# Patient Record
Sex: MALE | Race: WHITE | NOT HISPANIC OR LATINO | Employment: FULL TIME | URBAN - METROPOLITAN AREA
[De-identification: names, ages, dates, MRNs, and addresses within clinical notes are randomized per-mention and may not be internally consistent; named-entity substitution may affect disease eponyms.]

---

## 2021-10-28 ENCOUNTER — OFFICE VISIT - RIVER FALLS (OUTPATIENT)
Dept: FAMILY MEDICINE | Facility: CLINIC | Age: 61
End: 2021-10-28

## 2021-10-28 ASSESSMENT — MIFFLIN-ST. JEOR: SCORE: 1846.93

## 2022-02-12 VITALS — HEIGHT: 69 IN | BODY MASS INDEX: 34.47 KG/M2 | WEIGHT: 232.7 LBS

## 2022-02-15 NOTE — NURSING NOTE
Quick Intake Entered On:  10/30/2021 4:31 PM CDT    Performed On:  10/28/2021 4:31 PM CDT by Lydia Leon               Summary   Weight Measured :   232.7 lb(Converted to: 232 lb 11 oz, 105.551 kg)    Height Measured :   68.75 in(Converted to: 5 ft 9 in, 174.62 cm)    Body Mass Index :   34.61 kg/m2 (HI)    Body Surface Area :   2.26 m2   Lydia Leon - 10/30/2021 4:31 PM CDT

## 2022-02-15 NOTE — PROGRESS NOTES
Patient:   ARIS RASMUSSEN            MRN: [id number]            FIN: [id number]               Age:   61 years     Sex:  Male     :  1960   Associated Diagnoses:   Diabetes mellitus, type II; Obesity   Author:   Lydia Leon      Visit Information   Visit type:  Diabetes Self Management Education .    Referral source:  Zac GUILLAUME, Dr. Renan Gee  [facility] .       Chief Complaint   Diabetes Self Management Education, Obesity      History of Present Illness   10/28/2021  Pt here with hx of DM a1c above target.    Meds: Trulicity 0.75mg weekly, Farxiga 10mg,pt stopped Metformin stopped due to diarrhea    Discussed nutrition, diabetes, and lifestyle management with pt  Nutrition: enjoys pasta but     Fluids: No SSB, water, unsweetened tea, coffee  Physical Activity:  tries to stay active no exercise routine  Stress:   low  Sleep: good  Support: family  BG Testin day avg out of 14 readings 186 mg/dL  90 day avg out of 38 readings 187 mg/dL    DM related medication: Trulicity 0.75mg weekly, Farxiga 10mg,pt stopped Metformin stopped due to diarrhea  Routine diabetes care:    Dilated Retinal Eye Exam:  UTD  Skin: intact  Dental: due   Feet: monofilament test completed with MD   Immunizations: influenza and pneumonia vaccines completed   Hgb A1c: 8.2% = 189 mg/dL estimated average glucose 2021       Health Status   Allergies:    No active allergies have been recorded.   Medications:    Medications          No medications documented     Problem list:    All Problems  Obesity / SNOMED CT [id number] / Probable      Histories   Past Medical History:    No active or resolved past medical history items have been selected or recorded.   Family History:    No family history items have been selected or recorded.   Procedure history:    No active procedure history items have been selected or recorded.   Social History:             No active social history items have been recorded.      Health  Maintenance      Recommendations     Pending (in the next year)        OverDue           Influenza Vaccine due  09/01/21  and every 1  year(s)        Due            Alcohol Misuse Screen due  10/30/21  and every 1  year(s)           Colorectal Cancer Screen (Colonoscopy) due  10/30/21  Variable frequency           Colorectal Cancer Screen (Occult Blood) due  10/30/21  Variable frequency           Colorectal Cancer Screen (Sigmoidoscopy) due  10/30/21  Variable frequency           Depression Screen due  10/30/21  and every 1  year(s)           HIV Screen (if sexually active) due  10/30/21  and every 1  year(s)           Hepatitis C Screen 5983-6043 due  10/30/21  One-time only           High Blood Pressure Screen due  10/30/21  and every 1  year(s)           Lipid Disorders Screen due  10/30/21  and every 1  year(s)           Lung Cancer Screen due  10/30/21  and every 1  year(s)           STD Counseling (if sexually active) due  10/30/21  and every 1  year(s)           Syphilis Screen (if sexually active) due  10/30/21  and every 1  year(s)           Tobacco Use Screen due  10/30/21  and every 1  year(s)        Due In Future            Body Mass Index Check not due until  10/28/22  and every 1  year(s)           Obesity Screen and Counseling not due until  10/28/22  and every 1  year(s)     Satisfied (in the past 1 year)        Satisfied            Body Mass Index Check on  10/28/21.           Obesity Screen and Counseling on  10/28/21.          Impression and Plan   Diagnosis     Diabetes mellitus, type II (QLM32-UB E11.9).     Obesity (UYO13-PE E66.9).       Counseled: PatientDACIA Self Care Behaviors   Today the patient was instructed on the basic physiology of diabetes mellitus, BG testing, and lifestyle guidelines.  Healthy Eating - Education on what foods are primary sources of carbohydrates and how intake affects BG readings, edu on carbohydrate counting, reading food labels, appropriate portion sizes, well  balanced meals, diabetic plate method as a general rule.  Patient is provided with numerous ideas to incorporate dietary recommendations, meal planning and preparation, mindful eating techniques and weight loss tips.    Being Active - Education on how exercise helps with :    - lowering BG by increasing the muscles ability to take up and use glucose   - weight loss   - healthier heart (improve lipid profile)   - improve sleep, mood, energy   - decrease stress      Monitoring - Reviewed recommended testing schedule and blood glucose target levels.    Taking Medication - Education on the 8 core defects of type II diabetes and how the different classes of medications have different primary physiological actions.  Discussed trying to restart metformin at lower dose and monitor tolerance.  Also recommend increase in trulicity dosing to help promote glycemic control   Trulicity   1.5mg weekly x 4 weeks   3.0mg weekly x 4 weeks   4.5mg weekly goal dose    Continue with Farxiga at 10mg     Problem Solving -  medical support team assistance, resources provided (written, internet, apps); good control of stress  Healthy Coping - support of friends, family, and medical support team   Reducing Risks - Education on importance of good glucose control to help reduce the potential for developing microvascular and macrovascular complications associated with high blood glucose over time.    Education on the following complications    *heart attack/cardiovascular disease - prevention with heart healthy diet, daily activity, and weight control   *retinopathy - annual eye exam   *nephropathy - discussion on annual or prn kidney function labs with urinalysis    *stroke - maintaining recommended glucose control   *peripheral arterial disease - regular activity, maintaining recommended glucose control   *neuropathy - monofilament testing, regular activity, maintaining recommended glucose control  Appropriate foot care  education  Vaccinations as recommended influenza, pneumonia etc.   Routine dental appt.'s for prevention of periodontal diseases   Importance of adequate sleep   Good skin care, monitor for any open areas, sores, or cuts.      Pt able to verbalize understanding of above education, handouts provided for additional resources.       Goals:  1.  A1c <7%  2.  BG testing 2x/ day on 2 days/ week before eating 80 - 130 target; two hours after eating 80 - 150mg/dL  3.  Physical active 150 min/ week   4.  Carb controlled heart healthy meal plan <130gm CHO/ day; <200mg Cholesterol/ day   5.  Take medications as directed continue Farxiga 10mg qd, restart metformin at lower dose and monitor tolerance.  Recommend increase in Trulicity   1.5mg weekly x 4 weeks   3.0mg weekly x 4 weeks   4.5mg weekly goal dose      Follow up in 6 mo      Professional Services   Time spent with pt 60 min   cc Dr. Frank   cc Dr. Renan [facility]

## 2022-04-06 ENCOUNTER — ALLIED HEALTH/NURSE VISIT (OUTPATIENT)
Dept: EDUCATION SERVICES | Facility: CLINIC | Age: 62
End: 2022-04-06
Payer: COMMERCIAL

## 2022-04-06 VITALS — HEIGHT: 69 IN | WEIGHT: 224.2 LBS | BODY MASS INDEX: 33.21 KG/M2

## 2022-04-06 DIAGNOSIS — E11.9 TYPE 2 DIABETES, HBA1C GOAL < 7% (H): Primary | ICD-10-CM

## 2022-04-06 PROCEDURE — G0108 DIAB MANAGE TRN  PER INDIV: HCPCS | Performed by: DIETITIAN, REGISTERED

## 2022-04-06 RX ORDER — SIMVASTATIN 10 MG
10 TABLET ORAL AT BEDTIME
COMMUNITY
Start: 2022-02-01

## 2022-04-06 RX ORDER — DAPAGLIFLOZIN 10 MG/1
TABLET, FILM COATED ORAL
COMMUNITY
Start: 2022-02-10

## 2022-04-06 RX ORDER — DULAGLUTIDE 3 MG/.5ML
INJECTION, SOLUTION SUBCUTANEOUS
COMMUNITY
Start: 2022-03-02

## 2022-04-06 NOTE — LETTER
"    4/6/2022         RE: Lew Hurt  [address]  [place] [address]        Dear Colleague,    Thank you for referring your patient, Lew Hurt, to the [facility]. Please see a copy of my visit note below.    Diabetes Self-Management Education & Support    Presents for:  Follow up education    SUBJECTIVE/OBJECTIVE:  Diabetes education in the past 24mo: No  Diabetes type: Type 2  Disease course: Stable  How confident are you filling out medical forms by yourself:: Extremely  Cultural Influences/Ethnic Background:  Not  or     Pt is not able to fill Trulicity (too early) but will be changing to higher dose to help improve a1c and satiety.    Pt has been able to tolerate 1 tab metformin 500mg without diarrhea.    Continue with Farxiga 10mg   Pt taking all meds as directed    Pt's wife has started a weight loss plan and they have been working on improving dietary intake.      Diabetes Symptoms & Complications:  Fatigue: Yes  Neuropathy: Sometimes  Polydipsia: Sometimes  Polyphagia: No  Polyuria: No  Visual change: Sometimes  Slow healing wounds: No  Autonomic neuropathy: No  CVA: No  Heart disease: No  Nephropathy: No  Peripheral neuropathy: No  Peripheral Vascular Disease: No  Retinopathy: No eye exam completed at [facility] - follow up in 1 year   Sexual dysfunction: Yes    Patient Problem List and Family Medical History reviewed for relevant medical history, current medical status, and diabetes risk factors.    Vitals:  Ht 1.746 m (5' 8.75\")   Wt 101.7 kg (224 lb 3.2 oz)   BMI 33.35 kg/m    Estimated body mass index is 33.35 kg/m  as calculated from the following:    Height as of this encounter: 1.746 m (5' 8.75\").    Weight as of this encounter: 101.7 kg (224 lb 3.2 oz).   Last 3 BP:   BP Readings from Last 3 Encounters:   No data found for BP       History   Smoking Status     Not on file   Smokeless Tobacco     Not on file       Labs:  No results found " for: A1C  No results found for: GLC  No results found for: LDL  No results found for: HDL]  No results found for: GFRESTIMATED  No results found for: GFRESTBLACK  No results found for: CR  No results found for: MICROALBUMIN    Healthy Eating:  Cultural/Christianity diet restrictions?: No  Meal planning/habits: None  How many times a week on average do you eat food made away from home (restaurant/take-out)?: 2  Meals include: Breakfast, Lunch, Dinner  Beverages: Tea, Coffee    Cereal or toast - Pt knows it's all carbs   Today 2 pop tarts - 233 mg/dL     Being Active:  Barrier to exercise: Time    Monitoring:  Blood Glucose Meter: One Touch  Times checking blood sugar at home (number): 1  Times checking blood sugar at home (per): Day  Blood glucose trend: Fluctuating    BG avg 153 out of 30 day avg     Taking Medications:  Diabetes Medication(s)     Biguanides       metFORMIN (GLUCOPHAGE) 500 MG tablet    Take 500 mg by mouth    Sodium-Glucose Co-Transporter 2 (SGLT2) Inhibitors       FARXIGA 10 MG TABS tablet        Incretin Mimetic Agents (GLP-1 Receptor Agonists)       TRULICITY 3 MG/0.5ML SOPN              Current Treatments: Diet, Non-insulin Injectables, Oral Medication (taken by mouth)    Problem Solving:                 Reducing Risks:  CAD Risks: Diabetes Mellitus, Family history, Male sex, Sedentary lifestyle  Has dilated eye exam at least once a year?: Yes  Sees dentist every 6 months?: No  Feet checked by healthcare provider in the last year?: No    Healthy Coping:  Informal Support system:: Family, Spouse  Patient Activation Measure Survey Score:  No flowsheet data found.    Diabetes knowledge and skills assessment:   Patient is knowledgeable in diabetes management concepts related to: Monitoring, Taking Medication, Problem Solving, Reducing Risks and Healthy Coping    Patient needs further education on the following diabetes management concepts: Healthy Eating, Being Active, Monitoring and Taking  Medication    Based on learning assessment above, most appropriate setting for further diabetes education would be: Individual setting.      INTERVENTIONS:    Education provided today on:  AADE Self-Care Behaviors:  Healthy Eating: carbohydrate counting, weight reduction, heart healthy diet, portion control, plate planning method and label reading  Being Active: relationship to blood glucose, downloaded waking may on phone   Monitoring: individual blood glucose targets and frequency of monitoring  Taking Medication: discussed medication options, shared decision making to increase Trulicity to 4.5mg weekly   Problem Solving: sick day arrangements  Reducing Risks: major complications of diabetes, foot care, appropriate dental care, annual eye exam, A1C - goals, relating to blood glucose levels, how often to check, lipids levels and goals and blood pressure and goals  Healthy Coping: utilize support systems    Opportunities for ongoing education and support in diabetes-self management were discussed.    Pt verbalized understanding of concepts discussed and recommendations provided today.       Education Materials Provided:  Living Healthy with Diabetes, BG Log Sheet and My Plate Planner      ASSESSMENT:  Pt is motivated to get back on track with monitoring carb intake and increase in activity.  Pt's wife supportive and also making diet changes.  Recommend increase Trulicity to 4.5mg weekly and continue with metformin and Farxiga as directed.      Goals Addressed as of 4/6/2022 at 9:10 AM        Medication 1 (pt-stated)     Added 4/6/22 by Lydia Leon, JARRETT      Pt will take diabetes related medications as directed            Patient's most recent No results found for: A1C is not meeting goal of <7.0   2/5/2022 A1c 7.5% per [facility] med records     PLAN  See Patient Instructions for co-developed, patient-stated behavior change goals.  AVS printed and provided to patient today. See Follow-Up section for  recommended follow-up.      Time Spent: 60 minutes  Encounter Type: Individual    Any diabetes medication dose changes were made via the CDE Protocol and Collaborative Practice Agreement with the patient's referring provider. A copy of this encounter was shared with the provider.

## 2022-04-06 NOTE — PATIENT INSTRUCTIONS
Goals:  1. Practice healthy stress management and mindful eating - think are you physically hungry or are you bored, stressed, emotional etc, make of list of things to do besides eat.    2. Try to get good quality sleep with a goal of 7-8 hours per night.  3. Stay physically active daily.  Recommend working up to a total of 30 minutes on 5 days/ week.  Recommend a fitness tracker.     4. Eat in a healthy way- eliminate trans fats, limit saturated fats and added sugars; follow the plate method - picture above.  Keep a food record (MyFitnessPal, Loseit).    A meal is 3 or more food groups; make it colorful for better nutrition.    Total Carbohydrates (in grams) = Breakfast  30    Lunch  30    Supper  30    If desired snacks 15                                   Trulicity 4.5 mg weekly increase from 3mg   Add vegetables to lunch   Cucumbers, peppers, tomatoes, carrots, broccoli, Eggs in am

## 2022-04-06 NOTE — PROGRESS NOTES
"Diabetes Self-Management Education & Support    Presents for:  Follow up education    SUBJECTIVE/OBJECTIVE:  Diabetes education in the past 24mo: No  Diabetes type: Type 2  Disease course: Stable  How confident are you filling out medical forms by yourself:: Extremely  Cultural Influences/Ethnic Background:  Not  or     Pt is not able to fill Trulicity (too early) but will be changing to higher dose to help improve a1c and satiety.    Pt has been able to tolerate 1 tab metformin 500mg without diarrhea.    Continue with Farxiga 10mg   Pt taking all meds as directed    Pt's wife has started a weight loss plan and they have been working on improving dietary intake.      Diabetes Symptoms & Complications:  Fatigue: Yes  Neuropathy: Sometimes  Polydipsia: Sometimes  Polyphagia: No  Polyuria: No  Visual change: Sometimes  Slow healing wounds: No  Autonomic neuropathy: No  CVA: No  Heart disease: No  Nephropathy: No  Peripheral neuropathy: No  Peripheral Vascular Disease: No  Retinopathy: No eye exam completed at [facility] - follow up in 1 year   Sexual dysfunction: Yes    Patient Problem List and Family Medical History reviewed for relevant medical history, current medical status, and diabetes risk factors.    Vitals:  Ht 1.746 m (5' 8.75\")   Wt 101.7 kg (224 lb 3.2 oz)   BMI 33.35 kg/m    Estimated body mass index is 33.35 kg/m  as calculated from the following:    Height as of this encounter: 1.746 m (5' 8.75\").    Weight as of this encounter: 101.7 kg (224 lb 3.2 oz).   Last 3 BP:   BP Readings from Last 3 Encounters:   No data found for BP       History   Smoking Status     Not on file   Smokeless Tobacco     Not on file       Labs:  No results found for: A1C  No results found for: GLC  No results found for: LDL  No results found for: HDL]  No results found for: GFRESTIMATED  No results found for: GFRESTBLACK  No results found for: CR  No results found for: MICROALBUMIN    Healthy " Eating:  Cultural/Yazidism diet restrictions?: No  Meal planning/habits: None  How many times a week on average do you eat food made away from home (restaurant/take-out)?: 2  Meals include: Breakfast, Lunch, Dinner  Beverages: Tea, Coffee    Cereal or toast - Pt knows it's all carbs   Today 2 pop tarts - 233 mg/dL     Being Active:  Barrier to exercise: Time    Monitoring:  Blood Glucose Meter: One Touch  Times checking blood sugar at home (number): 1  Times checking blood sugar at home (per): Day  Blood glucose trend: Fluctuating    BG avg 153 out of 30 day avg     Taking Medications:  Diabetes Medication(s)     Biguanides       metFORMIN (GLUCOPHAGE) 500 MG tablet    Take 500 mg by mouth    Sodium-Glucose Co-Transporter 2 (SGLT2) Inhibitors       FARXIGA 10 MG TABS tablet        Incretin Mimetic Agents (GLP-1 Receptor Agonists)       TRULICITY 3 MG/0.5ML SOPN              Current Treatments: Diet, Non-insulin Injectables, Oral Medication (taken by mouth)    Problem Solving:                 Reducing Risks:  CAD Risks: Diabetes Mellitus, Family history, Male sex, Sedentary lifestyle  Has dilated eye exam at least once a year?: Yes  Sees dentist every 6 months?: No  Feet checked by healthcare provider in the last year?: No    Healthy Coping:  Informal Support system:: Family, Spouse  Patient Activation Measure Survey Score:  No flowsheet data found.    Diabetes knowledge and skills assessment:   Patient is knowledgeable in diabetes management concepts related to: Monitoring, Taking Medication, Problem Solving, Reducing Risks and Healthy Coping    Patient needs further education on the following diabetes management concepts: Healthy Eating, Being Active, Monitoring and Taking Medication    Based on learning assessment above, most appropriate setting for further diabetes education would be: Individual setting.      INTERVENTIONS:    Education provided today on:  AADE Self-Care Behaviors:  Healthy Eating: carbohydrate  counting, weight reduction, heart healthy diet, portion control, plate planning method and label reading  Being Active: relationship to blood glucose, downloaded waking may on phone   Monitoring: individual blood glucose targets and frequency of monitoring  Taking Medication: discussed medication options, shared decision making to increase Trulicity to 4.5mg weekly   Problem Solving: sick day arrangements  Reducing Risks: major complications of diabetes, foot care, appropriate dental care, annual eye exam, A1C - goals, relating to blood glucose levels, how often to check, lipids levels and goals and blood pressure and goals  Healthy Coping: utilize support systems    Opportunities for ongoing education and support in diabetes-self management were discussed.    Pt verbalized understanding of concepts discussed and recommendations provided today.       Education Materials Provided:  Living Healthy with Diabetes, BG Log Sheet and My Plate Planner      ASSESSMENT:  Pt is motivated to get back on track with monitoring carb intake and increase in activity.  Pt's wife supportive and also making diet changes.  Recommend increase Trulicity to 4.5mg weekly and continue with metformin and Farxiga as directed.      Goals Addressed as of 4/6/2022 at 9:10 AM        Medication 1 (pt-stated)     Added 4/6/22 by Lydia Leon, JARRETT      Pt will take diabetes related medications as directed            Patient's most recent No results found for: A1C is not meeting goal of <7.0   2/5/2022 A1c 7.5% per [facility] med records     PLAN  See Patient Instructions for co-developed, patient-stated behavior change goals.  AVS printed and provided to patient today. See Follow-Up section for recommended follow-up.      Time Spent: 60 minutes  Encounter Type: Individual    Any diabetes medication dose changes were made via the CDE Protocol and Collaborative Practice Agreement with the patient's referring provider. A copy of this encounter  was shared with the provider.

## 2022-04-08 ENCOUNTER — TELEPHONE (OUTPATIENT)
Dept: EDUCATION SERVICES | Facility: CLINIC | Age: 62
End: 2022-04-08
Payer: COMMERCIAL

## 2022-04-08 NOTE — TELEPHONE ENCOUNTER
Reason for Call:  Trulicity     Detailed comments: patient went to  rx at [facility] in  [place] and they are stating that it to early to pick it up. Patient states you know about this.     Phone Number Patient can be reached at: Home number on file [phone] (home)    Best Time: any    Can we leave a detailed message on this number? YES    Call taken on 4/8/2022 at 9:58 AM by Chiara Quinones      Fax was sent after CDE visit for increasing Trulicity to 4.5mg weekly.  Chiara called to confirm fax was received and sent to Pharm and it was.  Pt was called by Chiara and message left.  Lydia Leon RD on 4/11/2022 at 2:32 PM

## 2022-06-07 ENCOUNTER — TELEPHONE (OUTPATIENT)
Dept: EDUCATION SERVICES | Facility: CLINIC | Age: 62
End: 2022-06-07
Payer: COMMERCIAL

## 2022-06-07 NOTE — TELEPHONE ENCOUNTER
Reason for Call:  Other Rx    Detailed comments: Patient called and his [employer] insurance will no longer cover Farxiga.  The patient must have tried and failed Jardiance. Please call patient regarding trying another solution.    Phone Number Patient can be reached at: Home number on file [phone] (home)    Best Time: daytime hours    Can we leave a detailed message on this number? YES    Call taken on 6/7/2022 at 5:18 PM by PAIGE TERRY

## 2022-06-09 NOTE — TELEPHONE ENCOUNTER
Called and talked to patient will be faxing primary care provider with recommendation to start Jardiance as Farxiga is no longer covered under formulary